# Patient Record
Sex: FEMALE | Race: WHITE | URBAN - NONMETROPOLITAN AREA
[De-identification: names, ages, dates, MRNs, and addresses within clinical notes are randomized per-mention and may not be internally consistent; named-entity substitution may affect disease eponyms.]

---

## 2019-09-09 NOTE — PATIENT DISCUSSION
PEEWEE OU:  PRESCRIBE ARTIFICIAL TEARS BID - QID. DECREASE OUTDOOR EXPOSURE AND USE UV PROTECTION/ SUNGLASSES WITH OUTDOOR ACTIVITIES. RETURN FOR FOLLOW UP AS SCHEDULED.

## 2019-09-09 NOTE — PATIENT DISCUSSION
Pinguecula Counseling:  I have explained to the patient at length the diagnosis of pinguecula and its pathophysiology. I recommended the patient adequately protect their eyes from excessive UV light and dry, argelia conditions. The use of artificial tears in dry conditions was encouraged. Return for follow-up as scheduled.

## 2019-10-02 ENCOUNTER — IMPORTED ENCOUNTER (OUTPATIENT)
Dept: URBAN - NONMETROPOLITAN AREA CLINIC 1 | Facility: CLINIC | Age: 64
End: 2019-10-02

## 2019-10-02 PROBLEM — H52.4: Noted: 2019-10-02

## 2019-10-02 PROBLEM — Z96.1: Noted: 2019-10-02

## 2019-10-02 PROBLEM — H40.023: Noted: 2019-10-02

## 2019-10-02 PROCEDURE — 92014 COMPRE OPH EXAM EST PT 1/>: CPT

## 2019-10-02 PROCEDURE — 92015 DETERMINE REFRACTIVE STATE: CPT

## 2019-10-02 NOTE — PATIENT DISCUSSION
Glaucoma Suspect-  discussed findings w/ pt today-  no family hx that patient is aware of-  IOP today within normal limits at 16 OD and 17 OS-  C/Ds noted at 0.65 OU stable-  Continue to monitor PRNP/C IOL-  Discussed findings w/ pt today-  IOL clear stable-  PCO noted but not visually significant-  Continue to monitor PRN Long-Term Steroid Use-  Discussed findings w/ pt today-  Stable IOPs at this time-  Will need to continue monitoring closely for changesPresbyopia-  Discussed findings w/ pt today-  Pt happy with [facility] Toric IOLs OU-  Quick MR done and GLRx given-  Monitor yearly or PRN

## 2021-03-22 NOTE — PATIENT DISCUSSION
RETINA IS ATTACHED OU: PVD OD; PERIPHERAL PIGMENTED RETINAL HOLE OD; NO NEW HOLES OR TEARS SEEN ON DILATED EXAM TODAY.  RETINAL DETACHMENT SIGNS AND SYMPTOMS REVIEWED

## 2021-09-02 NOTE — PATIENT DISCUSSION
Surgery Counseling:  I have discussed the option of glasses versus cataract surgery versus following, It was explained that when vision no longer meets the patient's visual needs and a new prescription for glasses is not likely to improve the patient's visual symptoms, the option of cataract surgery is a reasonable next step. It was explained that there is no guarantee that removing the cataract will improve their visual symptoms; however, it is believed that the cataract is contributing to the patient's visual impairment and surgery may noticeably improve both the visual and functional status of the patient. After this discussion, the patient desires to proceed with cataract surgery with implantation of an intraocular lens to improve their vision for golfing, driving &amp; watching TV.

## 2021-09-02 NOTE — PATIENT DISCUSSION
MACULAR DRUSEN, OU:  PRESCRIBE AREDS 2 VITAMINS AND INCREASE UV PROTECTION. STRESS GOOD DIET AND NO SMOKING. FOLLOW WITH DR. Flip Melendez AS SCHEDULED.

## 2021-09-02 NOTE — PATIENT DISCUSSION
CATARACT, OU - VISUALLY SIGNIFICANT. SCHEDULE PHACO WITH IOL OD ONLY AT THIS TIME. WILL CONSIDER PROCEEDING WITH OS IF VISUAL SYMPTOMS PRESENT. GLASSES RX GIVEN TO FILL IF DESIRES IN THE EVENT PATIENT DOES NOT PROCEED WITH SURGERY. RECOMMEND THAT PATIENT SEE DR. HANCOCK PRIOR  [place].

## 2021-09-02 NOTE — PATIENT DISCUSSION
POSTERIOR VITREOUS DETACHMENT OD &amp; VITREOUS SYNERESIS OS: NO HOLES / TEARS SEEN ON DILATED EXAM. RD SIGNS AND SYMPTOMS REVIEWED. REASSURED PATIENT. RECOMMENDED PATIENT CALL IF ANY INCREASE OF FLASHES/FLOATERS.

## 2021-09-15 NOTE — PATIENT DISCUSSION
New Prescription: prednisolone acetate (prednisolone acetate): drops,suspension: 1% 1 drop three times a day as directed into right eye 09-

## 2021-09-15 NOTE — PATIENT DISCUSSION
New Prescription: moxifloxacin (moxifloxacin): drops: 0.5% 1 drop three times a day as directed into right eye 09-

## 2021-10-06 NOTE — PATIENT DISCUSSION
Discussed monovision. This helps lessen the dependence on glasses, but is a compromise and glasses are often still needed for some activities including driving and binocular vision. The patient understands & would not like to proceed with monovision.

## 2021-10-06 NOTE — PATIENT DISCUSSION
Based on the patient’s desires, recommend patient select the Toric IOL using laser-assisted surgery and astigmatism treatment after the risks and benefits were discussed. Glasses will still be needed for some activities depending on target and residual astigmatism.

## 2022-03-31 NOTE — PATIENT DISCUSSION
Educated patient on findings and conditions. Prescribe artificial tears BID-QID OU and warm compresses with massage QD/prn OU. Discussed good visual hygiene including conscious blinking. Advised to call/RTC if si/sx persist or worsen. Monitor.

## 2022-04-09 ASSESSMENT — VISUAL ACUITY
OS_CC: 20/50+2
OS_GLARE: 20/100
OS_SC: 20/50+2
OD_SC: 20/30
OD_GLARE: 20/70
OS_PH: 20/30
OD_CC: 20/20-2

## 2022-04-09 ASSESSMENT — TONOMETRY
OS_IOP_MMHG: 17
OD_IOP_MMHG: 16

## 2024-04-17 ENCOUNTER — NEW PATIENT (OUTPATIENT)
Dept: URBAN - NONMETROPOLITAN AREA CLINIC 4 | Facility: CLINIC | Age: 69
End: 2024-04-17

## 2024-04-17 DIAGNOSIS — H40.023: ICD-10-CM

## 2024-04-17 DIAGNOSIS — H26.491: ICD-10-CM

## 2024-04-17 DIAGNOSIS — H52.4: ICD-10-CM

## 2024-04-17 DIAGNOSIS — Z96.1: ICD-10-CM

## 2024-04-17 PROCEDURE — 92015 DETERMINE REFRACTIVE STATE: CPT

## 2024-04-17 PROCEDURE — 92004 COMPRE OPH EXAM NEW PT 1/>: CPT

## 2024-04-17 PROCEDURE — 92133 CPTRZD OPH DX IMG PST SGM ON: CPT

## 2024-04-17 ASSESSMENT — VISUAL ACUITY
OS_PH: 20/40
OD_PH: 20/40
OD_SC: 20/50
OS_BAT: 20/60
OD_BAT: 20/50
OS_SC: 20/50

## 2024-04-17 ASSESSMENT — TONOMETRY
OD_IOP_MMHG: 16
OS_IOP_MMHG: 16

## 2025-04-21 ENCOUNTER — COMPREHENSIVE EXAM (OUTPATIENT)
Age: 70
End: 2025-04-21

## 2025-04-21 DIAGNOSIS — H52.4: ICD-10-CM

## 2025-04-21 DIAGNOSIS — Z96.1: ICD-10-CM

## 2025-04-21 DIAGNOSIS — H16.223: ICD-10-CM

## 2025-04-21 DIAGNOSIS — H26.491: ICD-10-CM

## 2025-04-21 DIAGNOSIS — H40.023: ICD-10-CM

## 2025-04-21 PROCEDURE — 99214 OFFICE O/P EST MOD 30 MIN: CPT

## 2025-04-21 PROCEDURE — 92083 EXTENDED VISUAL FIELD XM: CPT

## 2025-04-21 PROCEDURE — 92015 DETERMINE REFRACTIVE STATE: CPT
